# Patient Record
Sex: FEMALE | Race: WHITE | NOT HISPANIC OR LATINO | Employment: PART TIME | URBAN - NONMETROPOLITAN AREA
[De-identification: names, ages, dates, MRNs, and addresses within clinical notes are randomized per-mention and may not be internally consistent; named-entity substitution may affect disease eponyms.]

---

## 2019-10-11 ENCOUNTER — MEDICAL CORRESPONDENCE (OUTPATIENT)
Dept: LAB | Facility: OTHER | Age: 44
End: 2019-10-11

## 2019-10-11 ENCOUNTER — RESULTS ONLY (OUTPATIENT)
Dept: LAB | Age: 44
End: 2019-10-11

## 2019-10-11 ENCOUNTER — APPOINTMENT (OUTPATIENT)
Dept: LAB | Facility: OTHER | Age: 44
End: 2019-10-11

## 2019-10-11 PROCEDURE — 86481 TB AG RESPONSE T-CELL SUSP: CPT

## 2019-10-15 LAB
GAMMA INTERFERON BACKGROUND BLD IA-ACNC: 0.11 IU/ML
M TB IFN-G BLD-IMP: NEGATIVE
M TB IFN-G CD4+ BCKGRND COR BLD-ACNC: >10 IU/ML
MITOGEN IGNF BCKGRD COR BLD-ACNC: 0 IU/ML
MITOGEN IGNF BCKGRD COR BLD-ACNC: 0.01 IU/ML

## 2024-02-14 ENCOUNTER — MEDICAL CORRESPONDENCE (OUTPATIENT)
Dept: HEALTH INFORMATION MANAGEMENT | Facility: OTHER | Age: 49
End: 2024-02-14
Payer: COMMERCIAL

## 2024-02-28 ENCOUNTER — TELEPHONE (OUTPATIENT)
Dept: FAMILY MEDICINE | Facility: OTHER | Age: 49
End: 2024-02-28
Payer: COMMERCIAL

## 2024-02-28 NOTE — TELEPHONE ENCOUNTER
Pt called stating that her PCP, Rupa Moreland at [facility] has sent [facility] a Neurology referral for Dr. Rafia blackburn. I was unable to find a referral. I called [employer] and they will be looking into it. Unit one should get a call back regarding this.  Once we have referral worked out, she needs to be scheduled to see Dr. Morataya.

## 2024-07-05 ENCOUNTER — OFFICE VISIT (OUTPATIENT)
Dept: NEUROLOGY | Facility: OTHER | Age: 49
End: 2024-07-05
Attending: PSYCHIATRY & NEUROLOGY
Payer: COMMERCIAL

## 2024-07-05 VITALS
DIASTOLIC BLOOD PRESSURE: 84 MMHG | HEART RATE: 69 BPM | BODY MASS INDEX: 29.56 KG/M2 | HEIGHT: 69 IN | WEIGHT: 199.6 LBS | RESPIRATION RATE: 12 BRPM | TEMPERATURE: 97.4 F | SYSTOLIC BLOOD PRESSURE: 120 MMHG | OXYGEN SATURATION: 96 %

## 2024-07-05 DIAGNOSIS — R90.89 ABNORMAL BRAIN MRI: ICD-10-CM

## 2024-07-05 DIAGNOSIS — D32.9 MENINGIOMA (H): Primary | ICD-10-CM

## 2024-07-05 PROCEDURE — G0463 HOSPITAL OUTPT CLINIC VISIT: HCPCS

## 2024-07-05 PROCEDURE — 99204 OFFICE O/P NEW MOD 45 MIN: CPT | Performed by: PSYCHIATRY & NEUROLOGY

## 2024-07-05 RX ORDER — HYDROXYZINE HYDROCHLORIDE 10 MG/1
10-20 TABLET, FILM COATED ORAL EVERY 8 HOURS PRN
COMMUNITY
Start: 2024-01-09

## 2024-07-05 RX ORDER — ONDANSETRON 4 MG/1
4 TABLET, FILM COATED ORAL EVERY 12 HOURS PRN
COMMUNITY
Start: 2024-07-01

## 2024-07-05 RX ORDER — LATANOPROST 50 UG/ML
1 SOLUTION/ DROPS OPHTHALMIC AT BEDTIME
COMMUNITY
Start: 2024-03-27

## 2024-07-05 RX ORDER — MONTELUKAST SODIUM 10 MG/1
1 TABLET ORAL AT BEDTIME
COMMUNITY
Start: 2024-01-30

## 2024-07-05 RX ORDER — ROSUVASTATIN CALCIUM 10 MG/1
1 TABLET, COATED ORAL DAILY
COMMUNITY
Start: 2024-02-06

## 2024-07-05 RX ORDER — METOPROLOL SUCCINATE 25 MG/1
25 TABLET, EXTENDED RELEASE ORAL
COMMUNITY
Start: 2023-09-27

## 2024-07-05 RX ORDER — SEMAGLUTIDE 0.5 MG/.5ML
0.5 INJECTION, SOLUTION SUBCUTANEOUS
COMMUNITY
Start: 2024-07-01

## 2024-07-05 RX ORDER — LISINOPRIL 20 MG/1
1 TABLET ORAL DAILY
COMMUNITY
Start: 2023-09-18

## 2024-07-05 RX ORDER — KETOCONAZOLE 20 MG/G
CREAM TOPICAL
COMMUNITY
Start: 2022-12-06

## 2024-07-05 RX ORDER — PAROXETINE 20 MG/1
1 TABLET, FILM COATED ORAL DAILY
COMMUNITY
Start: 2024-02-02

## 2024-07-05 ASSESSMENT — PAIN SCALES - GENERAL: PAINLEVEL: NO PAIN (0)

## 2024-07-05 NOTE — PROGRESS NOTES
Neurology Clinic - New Consultation  7/5/2024    Reason for Consultation: Meningioma    [facility]    Assessment:  #Meningioma  #Abnormal brain MRI    Ms. Pena is a 49-year-old female who neurology was consulted for an abnormal brain MRI.  Reason for referral is listed as meningioma.  Fortunately, her neurologic exam is normal and nonfocal.  Unfortunately, at this time I cannot review any of the images from [facility], but can review reports.  Reporting the imaging is that it is stable, but was obtained without contrast.  She is not having any symptoms that I can attribute to a meningioma in that region.  I discussed the natural prognosis and progression of meningiomas.  They are oftentimes stable or very slow-growing.  But did require a slight amount of follow-up with imaging.  She would like to get her care through [facility] and her primary care.  I think this is reasonable and we will write out a plan for imaging.  However if they do not feel comfortable ordering imaging or following up with them happy to see her again in follow-up.    Plan:    - Get Brain MRI in Feb 2025.  Please obtain with and without contrast  - If MRI is stable showing meningioma can repeat approximately every 3 years thereafter for monitoring  - If primary care does not feel comfortable ordering or following please let me know and I can order and will need follow up with me in that setting.  Otherwise follow up just on as needed basis.      Follow up in Neurology clinic as needed should new concerns arise.    Samy Morataya DO   of Neurology      47 min spent on the date of the encounter in chart review, patient visit, review of tests, documentation and/or discussion with other providers about the issues documented above.         History of Presenting Symptoms:  Lisbeth Pena is a 49 year old female who presents today for evaluation of Meningioma.     Her primary care wanted her to see a  "neurologist for this.  She states brain MRI got done for primarily for intermittent \"brain fog\" episodes.  She states when it happens it lasts for a week or two typically in the morning.  It is getting better throughout the day.    Then it will spontaneously improve for months.  Her last episode was approximately 5 months ago when she got her last brain MRI.     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    She had an image back in 2022 as well for the same symptoms.  I am not able to review images tehmselves.    There  is no reason that she could not receive contrast for her MRI's.   No history of focal weakness/numbness or seizure like activity that she has ever had.        ROS: Complete 10-point review of systems was negative except as noted in the HPI.    Past Medical History:  Hypertension, hyperlipidemia, prediabetes.      Past Surgical History:  History reviewed. No pertinent surgical history.    PCP: No Ref-Primary, Physician    Allergies:   Allergies   Allergen Reactions    Tramadol Itching       Medications:  No current outpatient medications on file.       Family History:  No family hx of meningiomas or brain tumors.     Social History:  Social History     Socioeconomic History    Marital status:      Spouse name: Not on file    Number of children: Not on file    Years of education: Not on file    Highest education level: Not on file   Occupational History    Not on file   Tobacco Use    Smoking status: Never    Smokeless tobacco: Never   Vaping Use    Vaping status: Never Used   Substance and Sexual Activity    Alcohol use: Not on file    Drug use: Not on file    Sexual activity: Not on file   Other Topics Concern    Not on file   Social History Narrative    Not on file     Social Determinants of Health     Financial Resource Strain: Low Risk  (2/14/2024)    Received from  and [facility],  " "and [facility]    Overall Financial Resource Strain (CARDIA)     Difficulty of Paying Living Expenses: Not hard at all   Food Insecurity: Low Risk  (7/5/2024)    Food Insecurity     Within the past 12 months, did you worry that your food would run out before you got money to buy more?: No     Within the past 12 months, did the food you bought just not last and you didn t have money to get more?: No   Transportation Needs: No Transportation Needs (2/14/2024)    [facility], [facility]    PRAPARE - Transportation     Lack of Transportation (Medical): No     Lack of Transportation (Non-Medical): No   Physical Activity: Not on file   Stress: Not on file   Social Connections: Not on file   Interpersonal Safety: Low Risk  (7/5/2024)    Interpersonal Safety     Do you feel physically and emotionally safe where you currently live?: Yes     Within the past 12 months, have you been hit, slapped, kicked or otherwise physically hurt by someone?: No     Within the past 12 months, have you been humiliated or emotionally abused in other ways by your partner or ex-partner?: No   Housing Stability: Low Risk  (2/14/2024)    Received from [facility]     Retired - What is your living situation today? : I have a steady place to live     Alcohol: none  Tobacco: none  Illicit drugs: none  Caffeine: couple cups daily    Physical Exam:  Vitals: /84 (BP Location: Right arm, Patient Position: Sitting, Cuff Size: Adult Large)   Pulse 69   Temp 97.4  F (36.3  C) (Temporal)   Resp 12   Ht 1.753 m (5' 9\")   Wt 90.5 kg (199 lb 9.6 oz)   SpO2 96%   BMI 29.48 kg/m     General: Seated comfortably in no acute distress.  HEENT: Neck supple with normal range of motion. No paracervical muscle tenderness or tightness.   Heart: Regular rate and rhythm  Lungs: breathing " comfortably    Extremities: no edema  Skin: No rashes  Neurologic:     Mental Status: Fully alert, attentive and oriented. Speech clear and fluent, no paraphasic errors.     Cranial Nerves:  PERRL. EOMI with no nystagmus. Facial sensation intact/symmetric. Facial movements symmetric. Hearing not formally tested but intact to conversation. Palate elevation symmetric, uvula midline. No dysarthria. Shoulder shrug strong bilaterally. Tongue protrusion midline.     Motor: No tremors or other abnormal movements observed. Muscle tone normal throughout. No pronator drift. Normal/symmetric rapid finger tapping. Strength 5/5 throughout upper and lower extremities.     Deep Tendon Reflexes: brisk and symmetric throughout upper and lower extremities. No clonus.      Sensory: Intact/symmetric to light touch throughout upper and lower extremities.  No sensory extinction.  Negative Romberg.     Coordination: Finger-nose-finger intact without dysmetria     gait: Normal, steady casual gait.  Able to tandem without difficulty.    Pertinent Investigations:    Brain MRI:    COMPARISON: 8/18/2022, 10/20/2020.     TECHNIQUE: Sagittal T1, axial double-echo T2, axial diffusion, and coronal and axial FLAIR images were obtained.     FINDINGS: No intracranial hemorrhage or mass effect is evident. No hydrocephalus is present. Scattered periventricular and deep white matter FLAIR/T2 hyperintensity. Orbits are intact. Paranasal sinuses and mastoids are patent. Visualized soft tissues are unremarkable. Visualized flow voids are intact. Unchanged 10 mm focus of susceptibility within the left parietal convexity (series 701 image 7, as well as numerous other series).     IMPRESSION:   1. No acute intracranial finding.   2. Unchanged likely mild chronic microvascular ischemic change.   3. Unchanged 10 mm focus of susceptibility adjacent to the left parietal convexity, likely a burned out meningioma.     Dragon disclaimer: This documentation was  completed with the aid of dictation software.  Please note that there may be some inconsistencies due to software errors.  Errors are corrected in real time, however if there is a remaining error, please do not hesitate to reach out for clarification.

## 2024-07-05 NOTE — PATIENT INSTRUCTIONS
Reason for today's visit: Meningioma    Your diagnosis: Probable meningioma    Tests that you will need:   - Brain MRI in February    Treatment plan:   - Get Brain MRI in Feb 2025.  Please obtain with and without contrast  - If MRI is stable showing meningioma can repeat approximately every 3 years thereafter for monitoring  - If primary care does not feel comfortable ordering or following please let me know and I can order and will need follow up with me in that setting.  Otherwise follow up just on as needed basis.      Your test results are reviewed several times a day.  Once they are all in, you will be sent a letter with your results and/or if you are signed up for on-line services, you will be e-mailed the results.  If there are serious findings, you typically will be called.    If you have any questions about your visit, your symptoms, your medication, your test results or it is not clear what your diagnosis or treatment plan is please contact our office at [phone] for general neurology    If you need follow-up in the future, please call [phone] for an appointment.      Samy Morataya DO  Neurology

## 2024-07-05 NOTE — LETTER
7/5/2024      Lisbeth Pena  [address]  [place] [address]      Dear Colleague,    Thank you for referring your patient, Lisbeth Pena, to the [facility]. Please see a copy of my visit note below.    Neurology Clinic - New Consultation  7/5/2024    Reason for Consultation: Meningioma    [facility]    Assessment:  #Meningioma  #Abnormal brain MRI    Ms. Pena is a 49-year-old female who neurology was consulted for an abnormal brain MRI.  Reason for referral is listed as meningioma.  Fortunately, her neurologic exam is normal and nonfocal.  Unfortunately, at this time I cannot review any of the images from [facility], but can review reports.  Reporting the imaging is that it is stable, but was obtained without contrast.  She is not having any symptoms that I can attribute to a meningioma in that region.  I discussed the natural prognosis and progression of meningiomas.  They are oftentimes stable or very slow-growing.  But did require a slight amount of follow-up with imaging.  She would like to get her care through [facility] and her primary care.  I think this is reasonable and we will write out a plan for imaging.  However if they do not feel comfortable ordering imaging or following up with them happy to see her again in follow-up.    Plan:    - Get Brain MRI in Feb 2025.  Please obtain with and without contrast  - If MRI is stable showing meningioma can repeat approximately every 3 years thereafter for monitoring  - If primary care does not feel comfortable ordering or following please let me know and I can order and will need follow up with me in that setting.  Otherwise follow up just on as needed basis.      Follow up in Neurology clinic as needed should new concerns arise.    Samy Morataya DO   of Neurology      47 min spent on the date of the encounter in chart review, patient visit, review of tests, documentation and/or discussion with  "other providers about the issues documented above.         History of Presenting Symptoms:  Lisbeth Pena is a 49 year old female who presents today for evaluation of Meningioma.     Her primary care wanted her to see a neurologist for this.  She states brain MRI got done for primarily for intermittent \"brain fog\" episodes.  She states when it happens it lasts for a week or two typically in the morning.  It is getting better throughout the day.    Then it will spontaneously improve for months.  Her last episode was approximately 5 months ago when she got her last brain MRI.     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    She had an image back in 2022 as well for the same symptoms.  I am not able to review images tehmselves.    There  is no reason that she could not receive contrast for her MRI's.   No history of focal weakness/numbness or seizure like activity that she has ever had.        ROS: Complete 10-point review of systems was negative except as noted in the HPI.    Past Medical History:  Hypertension, hyperlipidemia, prediabetes.      Past Surgical History:  History reviewed. No pertinent surgical history.    PCP: No Ref-Primary, Physician    Allergies:   Allergies   Allergen Reactions     Tramadol Itching       Medications:  No current outpatient medications on file.       Family History:  No family hx of meningiomas or brain tumors.     Social History:  Social History     Socioeconomic History     Marital status:      Spouse name: Not on file     Number of children: Not on file     Years of education: Not on file     Highest education level: Not on file   Occupational History     Not on file   Tobacco Use     Smoking status: Never     Smokeless tobacco: Never   Vaping Use     Vaping status: Never Used   Substance and Sexual Activity     Alcohol use: Not on file     Drug use: Not on file     Sexual activity: Not on file   Other " "Topics Concern     Not on file   Social History Narrative     Not on file     Social Determinants of Health     Financial Resource Strain: Low Risk  (2/14/2024)    Received [facility], [facility]    Overall Financial Resource Strain (CARDIA)      Difficulty of Paying Living Expenses: Not hard at all   Food Insecurity: Low Risk  (7/5/2024)    Food Insecurity      Within the past 12 months, did you worry that your food would run out before you got money to buy more?: No      Within the past 12 months, did the food you bought just not last and you didn t have money to get more?: No   Transportation Needs: No Transportation Needs (2/14/2024)    Received from [facility], [facility]    PRAPARE - Transportation      Lack of Transportation (Medical): No      Lack of Transportation (Non-Medical): No   Physical Activity: Not on file   Stress: Not on file   Social Connections: Not on file   Interpersonal Safety: Low Risk  (7/5/2024)    Interpersonal Safety      Do you feel physically and emotionally safe where you currently live?: Yes      Within the past 12 months, have you been hit, slapped, kicked or otherwise physically hurt by someone?: No      Within the past 12 months, have you been humiliated or emotionally abused in other ways by your partner or ex-partner?: No   Housing Stability: Low Risk  (2/14/2024)    Received from [facility]      Retired - What is your living situation today? : I have a steady place to live     Alcohol: none  Tobacco: none  Illicit drugs: none  Caffeine: couple cups daily    Physical Exam:  Vitals: /84 (BP Location: Right arm, Patient Position: Sitting, Cuff Size: Adult Large)   Pulse 69   Temp 97.4  F (36.3  C) (Temporal)   Resp 12   Ht 1.753 m (5' 9\")   Wt 90.5 kg (199 lb 9.6 " oz)   SpO2 96%   BMI 29.48 kg/m     General: Seated comfortably in no acute distress.  HEENT: Neck supple with normal range of motion. No paracervical muscle tenderness or tightness.   Heart: Regular rate and rhythm  Lungs: breathing comfortably    Extremities: no edema  Skin: No rashes  Neurologic:     Mental Status: Fully alert, attentive and oriented. Speech clear and fluent, no paraphasic errors.     Cranial Nerves:  PERRL. EOMI with no nystagmus. Facial sensation intact/symmetric. Facial movements symmetric. Hearing not formally tested but intact to conversation. Palate elevation symmetric, uvula midline. No dysarthria. Shoulder shrug strong bilaterally. Tongue protrusion midline.     Motor: No tremors or other abnormal movements observed. Muscle tone normal throughout. No pronator drift. Normal/symmetric rapid finger tapping. Strength 5/5 throughout upper and lower extremities.     Deep Tendon Reflexes: brisk and symmetric throughout upper and lower extremities. No clonus.      Sensory: Intact/symmetric to light touch throughout upper and lower extremities.  No sensory extinction.  Negative Romberg.     Coordination: Finger-nose-finger intact without dysmetria     gait: Normal, steady casual gait.  Able to tandem without difficulty.    Pertinent Investigations:    Brain MRI:    COMPARISON: 8/18/2022, 10/20/2020.     TECHNIQUE: Sagittal T1, axial double-echo T2, axial diffusion, and coronal and axial FLAIR images were obtained.     FINDINGS: No intracranial hemorrhage or mass effect is evident. No hydrocephalus is present. Scattered periventricular and deep white matter FLAIR/T2 hyperintensity. Orbits are intact. Paranasal sinuses and mastoids are patent. Visualized soft tissues are unremarkable. Visualized flow voids are intact. Unchanged 10 mm focus of susceptibility within the left parietal convexity (series 701 image 7, as well as numerous other series).     IMPRESSION:   1. No acute intracranial finding.    2. Unchanged likely mild chronic microvascular ischemic change.   3. Unchanged 10 mm focus of susceptibility adjacent to the left parietal convexity, likely a burned out meningioma.     Dragon disclaimer: This documentation was completed with the aid of dictation software.  Please note that there may be some inconsistencies due to software errors.  Errors are corrected in real time, however if there is a remaining error, please do not hesitate to reach out for clarification.        Again, thank you for allowing me to participate in the care of your patient.        Sincerely,        Samy Morataya MD

## 2024-07-05 NOTE — NURSING NOTE
"Chief Complaint   Patient presents with    Consult     Meningioma, referred from [facility].       Initial /84 (BP Location: Right arm, Patient Position: Sitting, Cuff Size: Adult Large)   Pulse 69   Temp 97.4  F (36.3  C) (Temporal)   Resp 12   Ht 1.753 m (5' 9\")   Wt 90.5 kg (199 lb 9.6 oz)   SpO2 96%   BMI 29.48 kg/m   Estimated body mass index is 29.48 kg/m  as calculated from the following:    Height as of this encounter: 1.753 m (5' 9\").    Weight as of this encounter: 90.5 kg (199 lb 9.6 oz).  Medication Review: complete    The next two questions are to help us understand your food security.  If you are feeling you need any assistance in this area, we have resources available to support you today.          7/5/2024   SDOH- Food Insecurity   Within the past 12 months, did you worry that your food would run out before you got money to buy more? N   Within the past 12 months, did the food you bought just not last and you didn t have money to get more? N            Health Care Directive:  Patient does not have a Health Care Directive or Living Will: Discussed advance care planning with patient; however, patient declined at this time.    Isaiah Davila      "

## 2024-09-30 ENCOUNTER — TELEPHONE (OUTPATIENT)
Dept: NEUROLOGY | Facility: CLINIC | Age: 49
End: 2024-09-30
Payer: COMMERCIAL

## 2024-09-30 DIAGNOSIS — D32.9 MENINGIOMA (H): Primary | ICD-10-CM

## 2024-09-30 NOTE — TELEPHONE ENCOUNTER
Patient called wanting to schedule a MRI that Dr Morataya had told her to get done in February. There is currently no orders placed.    Thank you,    AMARA MATT on 9/30/2024 at 10:48 AM

## 2024-09-30 NOTE — TELEPHONE ENCOUNTER
"Per 7/5/24 office visit note with Neurology:    \"- Get Brain MRI in Feb 2025.  Please obtain with and without contrast  - If MRI is stable showing meningioma can repeat approximately every 3 years thereafter for monitoring  - If primary care does not feel comfortable ordering or following please let me know and I can order and will need follow up with me in that setting.  Otherwise follow up just on as needed basis. \"    Patient does not have a PCP. At least there is not one listed in chart. Orders will need to come from Dr. Morataya.     Purvi Cavanaugh CMA on 9/30/2024 at 2:28 PM    "

## 2024-11-05 ENCOUNTER — HOSPITAL ENCOUNTER (OUTPATIENT)
Dept: MRI IMAGING | Facility: OTHER | Age: 49
Discharge: HOME | End: 2024-11-05
Attending: NURSE PRACTITIONER | Admitting: NURSE PRACTITIONER
Payer: COMMERCIAL

## 2024-11-05 DIAGNOSIS — N28.9 KIDNEY LESION, NATIVE, LEFT: ICD-10-CM

## 2024-11-05 PROCEDURE — 255N000002 HC RX 255 OP 636

## 2024-11-05 PROCEDURE — 74183 MRI ABD W/O CNTR FLWD CNTR: CPT

## 2024-11-05 PROCEDURE — A9575 INJ GADOTERATE MEGLUMI 0.1ML: HCPCS

## 2024-11-05 RX ADMIN — GADOTERATE MEGLUMINE 17 ML: 376.9 INJECTION INTRAVENOUS at 08:18

## 2024-11-30 ENCOUNTER — HEALTH MAINTENANCE LETTER (OUTPATIENT)
Age: 49
End: 2024-11-30

## 2025-01-15 ENCOUNTER — HOSPITAL ENCOUNTER (OUTPATIENT)
Dept: MRI IMAGING | Facility: OTHER | Age: 50
Discharge: HOME | End: 2025-01-15
Attending: PSYCHIATRY & NEUROLOGY
Payer: COMMERCIAL

## 2025-01-15 DIAGNOSIS — D32.9 MENINGIOMA (H): ICD-10-CM

## 2025-01-15 PROCEDURE — A9575 INJ GADOTERATE MEGLUMI 0.1ML: HCPCS | Performed by: PSYCHIATRY & NEUROLOGY

## 2025-01-15 PROCEDURE — 255N000002 HC RX 255 OP 636: Performed by: PSYCHIATRY & NEUROLOGY

## 2025-01-15 PROCEDURE — 70553 MRI BRAIN STEM W/O & W/DYE: CPT

## 2025-01-15 RX ADMIN — GADOTERATE MEGLUMINE 19 ML: 376.9 INJECTION INTRAVENOUS at 08:02

## 2025-06-26 ENCOUNTER — HOSPITAL ENCOUNTER (OUTPATIENT)
Dept: CT IMAGING | Facility: OTHER | Age: 50
End: 2025-06-26
Attending: UROLOGY
Payer: COMMERCIAL

## 2025-06-26 DIAGNOSIS — N28.89 LEFT RENAL MASS: ICD-10-CM

## 2025-06-26 PROCEDURE — 250N000009 HC RX 250

## 2025-06-26 PROCEDURE — 74178 CT ABD&PLV WO CNTR FLWD CNTR: CPT

## 2025-06-26 PROCEDURE — 250N000011 HC RX IP 250 OP 636

## 2025-06-26 RX ORDER — IOPAMIDOL 755 MG/ML
114 INJECTION, SOLUTION INTRAVASCULAR ONCE
Status: COMPLETED | OUTPATIENT
Start: 2025-06-26 | End: 2025-06-26

## 2025-06-26 RX ADMIN — IOPAMIDOL 114 ML: 755 INJECTION, SOLUTION INTRAVENOUS at 09:05

## 2025-06-26 RX ADMIN — SODIUM CHLORIDE 60 ML: 9 INJECTION, SOLUTION INTRAVENOUS at 09:05
